# Patient Record
Sex: MALE | Race: WHITE
[De-identification: names, ages, dates, MRNs, and addresses within clinical notes are randomized per-mention and may not be internally consistent; named-entity substitution may affect disease eponyms.]

---

## 2018-07-10 ENCOUNTER — HOSPITAL ENCOUNTER (EMERGENCY)
Dept: HOSPITAL 59 - ER | Age: 43
Discharge: HOME | End: 2018-07-10
Payer: MEDICAID
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
## 2018-07-10 DIAGNOSIS — S00.83XA: Primary | ICD-10-CM

## 2018-07-10 DIAGNOSIS — F17.210: ICD-10-CM
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
## 2018-07-10 PROCEDURE — 99282 EMERGENCY DEPT VISIT SF MDM: CPT

## 2018-07-10 NOTE — EMERGENCY DEPARTMENT RECORD
History of Present Illness





- General


Chief complaint: Head Injury


Stated complaint: FALL HIT HEAD


Time Seen by Provider: 07/10/18 18:22


Source: Patient


Mode of Arrival: Ambulatory


Limitations: No limitations


Travel/Exposure to [place] Within 21 Days of Symptoms: No





- History of Present Illness


Initial comments: 





missed last step fell and hi tfront of head on door frame.  No LOC, no neck 

pain. 


MD Complaint: Head injury


Onset/Timing: 3


-: Hour(s)


Mechanism of Injury: Other


Location: Frontal


Loss of Consciousness: No


Place: Home


Radiation: None


Severity: Moderate


Severity scale (1-10): 8


Consistency: Constant


Provoking factors: None known


Other Injuries: None


Associated Symptoms: Denies other symptoms





- Related Data


 Home Medications











 Medication  Instructions  Recorded  Confirmed  Last Taken


 


No Home Med [NO HOME MEDS]  07/10/18 07/10/18 Unknown











Allergies/Adverse reactions: 


 Allergies











Allergy/AdvReac Type Severity Reaction Status Date / Time


 


No Known Allergies Allergy   Unverified 07/10/18 18:01














Travel Screening





- Travel/Exposure Within Last 30 Days


Have you traveled within the last 30 days?: No





Review of Systems


Constitutional: Denies: Chills, Fever


Eyes: Denies: Eye discharge, Vision change


ENT: Denies: Congestion


Respiratory: Denies: Cough, Hemoptysis


Cardiovascular: Denies: Arrhythmia, Chest pain


Endocrine: Denies: Fatigue


Gastrointestinal: Denies: Abdominal pain


Genitourinary: Denies: Discharge


Musculoskeletal: Denies: Arthralgia, Back pain





Past Medical History





- SOCIAL HISTORY


Smoking Status: Current every day smoker


Alcohol Use: Rare


Drug Use: None





- RESPIRATORY


Hx Respiratory Disorders: No





- CARDIOVASCULAR


Hx Cardio Disorders: No





- NEURO


Hx Neuro Disorders: No





- GI


Hx GI Disorders: No





- 


Hx Genitourinary Disorders: No





- ENDOCRINE


Hx Endocrine Disorders: No





- MUSCULOSKELETAL


Hx Musculoskeletal Disorders: No





- PSYCH


Hx Psych Problems: No





- HEMATOLOGY/ONCOLOGY


Hx Hematology/Oncology Disorders: No





Family Medical History


Any Significant Family History?: No





Physical Exam





- General


General Appearance: Alert, Oriented x3, Cooperative


Limitations: No limitations





- Head


Head exam: Other (no depressed area no bruising)


Head exam detail: Contusion.  negative: Hematoma





- Eye


Eye exam: Normal appearance, PERRL, EOMI





- ENT


ENT exam: Normal exam, Mucous membranes moist, Normal external ear exam, Normal 

orophraynx, TM's normal bilaterally





- Neck


Neck exam: Normal inspection





- Respiratory


Respiratory exam: Normal lung sounds bilaterally.  negative: Rhonchi, Wheezes





- Cardiovascular


Cardiovascular Exam: Regular rate, Normal rhythm





- GI/Abdominal


GI/Abdominal exam: Soft, Normal bowel sounds.  negative: Tenderness





- Extremities


Extremities exam: Normal inspection.  negative: Tenderness





- Back


Back exam: Reports: Normal inspection.  Denies: Paraspinal tenderness





- Neurological


Neurological exam: Alert, CN II-XII intact, Normal gait, Oriented X3





- Psychiatric


Psychiatric exam: Normal affect, Normal mood





- Skin


Skin exam: Normal color





Course





 Vital Signs











  07/10/18





  18:13


 


Temperature 98.7 F


 


Pulse Rate 82


 


Respiratory 18





Rate 


 


Blood Pressure 155/95


 


Pulse Ox 96














Disposition


Disposition: Discharge


Clinical Impression: 


 Contusion of head





Disposition: Home, Self-Care


Condition: (1) Good


Instructions:  Contusion in Adults (ED)


Additional Instructions: 


Cool compress and tylenol. 


Forms:  Patient Portal Access





Quality





- Quality Measures


Quality Measures: N/A, Blunt Head Trauma (>2yr)





- Blunt Head Trauma - Adult


Quality Measure: Measure #415: Utilization of CT for Minor Blunt Head Trauma


ICD10 Codes Entered: Yes


Was CT ordered: No


[place] Score: Please complete [place] Coma Scale above


Utilization of CT for Minor Blunt Head Trauma: Not Eligible For Measure


Additional Inclusion Criteria: More than 24hrs (OR) GCS not 15 (OR) CT not 

ordered.


Not Eligible Reason: CT Not Ordered





- Blood Pressure Screening


Does Patient Have Any of the Following: No


Blood Pressure Classification: Hypertensive Reading


Systolic Measurement: 155


Diastolic Measurement: 95


Screening for High Blood Pressure: < Pre-Hypertensive BP, F/U Documented > [

]


Pre-Hypertensive Follow-up Interventions: Follow-up with rescreen every year.